# Patient Record
Sex: MALE | Race: OTHER | HISPANIC OR LATINO
[De-identification: names, ages, dates, MRNs, and addresses within clinical notes are randomized per-mention and may not be internally consistent; named-entity substitution may affect disease eponyms.]

---
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
## 2022-08-10 ENCOUNTER — EMERGENCY (EMERGENCY)
Facility: HOSPITAL | Age: 22
LOS: 0 days | Discharge: HOME | End: 2022-08-10
Attending: EMERGENCY MEDICINE | Admitting: EMERGENCY MEDICINE

## 2022-08-10 VITALS
OXYGEN SATURATION: 100 % | HEIGHT: 71 IN | HEART RATE: 94 BPM | WEIGHT: 130.07 LBS | SYSTOLIC BLOOD PRESSURE: 117 MMHG | TEMPERATURE: 98 F | RESPIRATION RATE: 18 BRPM | DIASTOLIC BLOOD PRESSURE: 59 MMHG

## 2022-08-10 DIAGNOSIS — H92.21 OTORRHAGIA, RIGHT EAR: ICD-10-CM

## 2022-08-10 DIAGNOSIS — Z91.018 ALLERGY TO OTHER FOODS: ICD-10-CM
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
## 2022-08-10 PROCEDURE — 99284 EMERGENCY DEPT VISIT MOD MDM: CPT

## 2022-08-10 RX ORDER — OFLOXACIN 0.3 %
1 DROPS OPHTHALMIC (EYE) ONCE
Refills: 0 | Status: COMPLETED | OUTPATIENT
Start: 2022-08-10 | End: 2022-08-10

## 2022-08-10 RX ORDER — IBUPROFEN 200 MG
600 TABLET ORAL ONCE
Refills: 0 | Status: COMPLETED | OUTPATIENT
Start: 2022-08-10 | End: 2022-08-10

## 2022-08-10 RX ADMIN — Medication 600 MILLIGRAM(S): at 08:59

## 2022-08-10 RX ADMIN — Medication 1 DROP(S): at 08:59

## 2022-08-10 NOTE — ED PROVIDER NOTE - NSFOLLOWUPINSTRUCTIONS_ED_ALL_ED_FT
SEEK IMMEDIATE MEDICAL CARE IF YOU HAVE THE FOLLOWING SYMPTOMS: pain that is not controlled with medicine, swelling/redness/pain around your ear, facial paralysis, tenderness of the bone behind your ear when you touch it, neck lump or neck stiffness.

## 2022-08-10 NOTE — ED PROVIDER NOTE - PHYSICAL EXAMINATION
VITAL SIGNS: I have reviewed nursing notes and confirm.  CONSTITUTIONAL: Patient is in no acute distress.  SKIN: Skin exam is warm and dry, no acute rash.  HEAD: Normocephalic; atraumatic.  EYES: PERRL, EOM intact; conjunctiva and sclera clear.  ENT: +Tissue injury to right ear. No active bleeding. Dried blood noted. No laceration. No nasal discharge; airway clear.   NECK: Supple; non tender.  EXT: Normal ROM. No edema.  LYMPH: No acute cervical adenopathy.  NEURO: Alert, oriented. Grossly unremarkable. No focal deficits.  PSYCH: Cooperative, appropriate.
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
## 2022-08-10 NOTE — ED ADULT NURSE NOTE - HOW OFTEN DO YOU HAVE A DRINK CONTAINING ALCOHOL?
Shingrex vaccine discussion  COVID vaccines UTD  Labs with CKD and A1c follow up in August 2021     Never

## 2022-08-10 NOTE — ED PROVIDER NOTE - CARE PROVIDER_API CALL
Cirilo Alvarado)  Otolaryngology  [address] [place] [address], [address] [place] [address]  Phone: [phone]  Fax: [phone]  Follow Up Time: 1-3 Days
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
## 2022-08-10 NOTE — ED PROVIDER NOTE - NS ED ATTENDING STATEMENT MOD
This was a shared visit with the EZEKIEL. I reviewed and verified the documentation and independently performed the documented:

## 2022-08-10 NOTE — ED PROVIDER NOTE - NS ED ROS FT
Review of Systems:  	•	CONSTITUTIONAL - no fever, no diaphoresis, no chills  	•	SKIN - no rash  	•	HEMATOLOGIC - no bleeding, no bruising  	•	EYES - +ear pain and bleeding, no eye pain, no blurry vision  	•	ENT - no congestion  	•	MUSCULOSKELETAL - no joint paint, no swelling, no redness  	•	NEUROLOGIC - no weakness, no headache, no paresthesias, no LOC  	•	PSYCH - no anxiety, no depression  	All other ROS are negative except as documented in HPI.
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
## 2025-01-07 NOTE — ED ADULT NURSE NOTE - PRO INTERPRETER NEED 2
Medication:   Requested Prescriptions     Signed Prescriptions Disp Refills    Insulin Pen Needle (BD PEN NEEDLE MIHAELA 2ND GEN) 32G X 4 MM MISC 150 each 3     Sig: USE TO INJECT INSULIN 4 TIMES DAILY     Authorizing Provider: VICKY URIBE     Ordering User: SIERRA FRANCISCO         Last appt: 10/9/2024   Next appt: 2/18/2025    Last Labs DM:   Lab Results   Component Value Date/Time    LABA1C 6.6 10/09/2024 10:40 AM     Last Lipid:   Lab Results   Component Value Date/Time    CHOL 127 06/19/2024 06:37 AM    TRIG 80 06/19/2024 06:37 AM    HDL 54 06/19/2024 06:37 AM     Last PSA: No results found for: \"PSA\"  Last Thyroid:   Lab Results   Component Value Date/Time    TSH 1.24 10/09/2024 11:18 AM    W9MOHCO 0.68 10/07/2019 10:25 AM    T4FREE 1.2 11/05/2022 08:47 AM      English good minus